# Patient Record
Sex: FEMALE | Race: WHITE | NOT HISPANIC OR LATINO
[De-identification: names, ages, dates, MRNs, and addresses within clinical notes are randomized per-mention and may not be internally consistent; named-entity substitution may affect disease eponyms.]

---

## 2022-08-02 ENCOUNTER — NON-APPOINTMENT (OUTPATIENT)
Age: 82
End: 2022-08-02

## 2022-08-02 ENCOUNTER — APPOINTMENT (OUTPATIENT)
Dept: OPHTHALMOLOGY | Facility: CLINIC | Age: 82
End: 2022-08-02

## 2022-08-02 PROBLEM — Z00.00 ENCOUNTER FOR PREVENTIVE HEALTH EXAMINATION: Status: ACTIVE | Noted: 2022-08-02

## 2022-08-02 PROCEDURE — 92004 COMPRE OPH EXAM NEW PT 1/>: CPT

## 2022-08-02 PROCEDURE — 92136 OPHTHALMIC BIOMETRY: CPT

## 2023-04-05 ENCOUNTER — APPOINTMENT (OUTPATIENT)
Dept: SPINE | Facility: CLINIC | Age: 83
End: 2023-04-05
Payer: MEDICARE

## 2023-04-05 ENCOUNTER — RESULT REVIEW (OUTPATIENT)
Age: 83
End: 2023-04-05

## 2023-04-05 ENCOUNTER — NON-APPOINTMENT (OUTPATIENT)
Age: 83
End: 2023-04-05

## 2023-04-05 ENCOUNTER — OUTPATIENT (OUTPATIENT)
Dept: OUTPATIENT SERVICES | Facility: HOSPITAL | Age: 83
LOS: 1 days | End: 2023-04-05
Payer: MEDICARE

## 2023-04-05 DIAGNOSIS — Z87.19 PERSONAL HISTORY OF OTHER DISEASES OF THE DIGESTIVE SYSTEM: ICD-10-CM

## 2023-04-05 DIAGNOSIS — Z87.891 PERSONAL HISTORY OF NICOTINE DEPENDENCE: ICD-10-CM

## 2023-04-05 DIAGNOSIS — Z78.9 OTHER SPECIFIED HEALTH STATUS: ICD-10-CM

## 2023-04-05 DIAGNOSIS — E78.01 FAMILIAL HYPERCHOLESTEROLEMIA: ICD-10-CM

## 2023-04-05 PROCEDURE — 72052 X-RAY EXAM NECK SPINE 6/>VWS: CPT | Mod: 26,59

## 2023-04-05 PROCEDURE — 72084 X-RAY EXAM ENTIRE SPI 6/> VW: CPT | Mod: 26

## 2023-04-05 PROCEDURE — 72052 X-RAY EXAM NECK SPINE 6/>VWS: CPT

## 2023-04-05 PROCEDURE — 99204 OFFICE O/P NEW MOD 45 MIN: CPT

## 2023-04-05 PROCEDURE — 72114 X-RAY EXAM L-S SPINE BENDING: CPT

## 2023-04-05 PROCEDURE — 72082 X-RAY EXAM ENTIRE SPI 2/3 VW: CPT

## 2023-04-05 RX ORDER — METHYLPREDNISOLONE 4 MG/1
4 TABLET ORAL
Qty: 1 | Refills: 0 | Status: ACTIVE | COMMUNITY
Start: 2023-04-05 | End: 1900-01-01

## 2023-04-05 NOTE — HISTORY OF PRESENT ILLNESS
[FreeTextEntry1] : acute neck pain with R hand weakness [de-identified] : 83 yo RH F with PMH of cervical and lumbar stenosis who underwent cervical posterior decompression/fusion and lumbar laminectomy with Dr. Glynn @ [facility] in 5/2022 who reports sudden onset of moderate to severe neck to RUE pain with weakness for 2 weeks without injury.\par Prior to the surgery in 2022, she was having back pain and some balance problem and images showed severe stenosis with cord compression in cervical spine. AS per patient her pain has been controlled well until recently. \par Currently pain is described as severe intermittent on her R sided neck radiating to R shoulder/shoulder blade/arm with weakness on R hand causing difficulty grabbing objects. Additionally she reports moderate R thigh tightness for one week. She denies balance problem, BB dysfunction, LE weakness. \par She ambulates without assistive device (occasionally use cane).\par She is unable to tolerate NSAIDs (gastric issue), taking gabapentin 100mg TID/Tylenol PRN occasionally without improvement.

## 2023-04-05 NOTE — PHYSICAL EXAM
[General Appearance - Alert] : alert [General Appearance - In No Acute Distress] : in no acute distress [General Appearance - Well Nourished] : well nourished [General Appearance - Well-Appearing] : healthy appearing [Oriented To Time, Place, And Person] : oriented to person, place, and time [Impaired Insight] : insight and judgment were intact [Affect] : the affect was normal [Memory Recent] : recent memory was not impaired [4] : T1 abductor digiti minimi 4/5 [5] : S1 flexor hallucis longus 5/5 [Abnormal Walk] : normal gait [Balance] : balance was intact [1+] : Ankle jerk left 1+ [Owusu] : Owusu's sign was not demonstrated

## 2023-04-05 NOTE — ASSESSMENT
[FreeTextEntry1] : PLAN\par - medrol pack\par - continue gabapentin 100mg QHS and increase to 300mg if she can tolerates\par - Xray C/L spine 6 views, scoliosis today\par - bring MRI cervical spine on next visit (reports she had this done today @ [facility])\par - RTC next week to review images

## 2023-04-13 ENCOUNTER — APPOINTMENT (OUTPATIENT)
Dept: SPINE | Facility: CLINIC | Age: 83
End: 2023-04-13
Payer: MEDICARE

## 2023-04-13 ENCOUNTER — NON-APPOINTMENT (OUTPATIENT)
Age: 83
End: 2023-04-13

## 2023-04-13 VITALS
TEMPERATURE: 97.3 F | BODY MASS INDEX: 22.47 KG/M2 | WEIGHT: 119 LBS | RESPIRATION RATE: 17 BRPM | SYSTOLIC BLOOD PRESSURE: 160 MMHG | HEART RATE: 75 BPM | OXYGEN SATURATION: 96 % | HEIGHT: 61 IN | DIASTOLIC BLOOD PRESSURE: 75 MMHG

## 2023-04-13 PROCEDURE — 99215 OFFICE O/P EST HI 40 MIN: CPT

## 2023-05-10 NOTE — HISTORY OF PRESENT ILLNESS
[de-identified] : Patient is an 82-year-old woman who is the sister of one of my former patients she had a spine surgery at [facility] and has had some issues and is here for second opinion

## 2023-05-10 NOTE — ASSESSMENT
[FreeTextEntry1] : We do long discussion about her recovery and I would like to take some time to see how she improves we discussed what it would take to repair this and answered all her questions and her family's questions to the best my ability.\par \par Art Colvin M.D., M.Sc.\par \par Department of Neurosurgery\par [facility]\par [facility]\par [facility]\par [place], [place]\par [email or web address]\par [phone]

## 2023-06-27 ENCOUNTER — APPOINTMENT (OUTPATIENT)
Dept: PAIN MANAGEMENT | Facility: CLINIC | Age: 83
End: 2023-06-27
Payer: MEDICARE

## 2023-06-27 VITALS
BODY MASS INDEX: 22.08 KG/M2 | SYSTOLIC BLOOD PRESSURE: 148 MMHG | WEIGHT: 120 LBS | HEART RATE: 73 BPM | HEIGHT: 62 IN | DIASTOLIC BLOOD PRESSURE: 81 MMHG | OXYGEN SATURATION: 98 % | RESPIRATION RATE: 18 BRPM

## 2023-06-27 DIAGNOSIS — M54.12 RADICULOPATHY, CERVICAL REGION: ICD-10-CM

## 2023-06-27 DIAGNOSIS — M50.20 OTHER CERVICAL DISC DISPLACEMENT, UNSPECIFIED CERVICAL REGION: ICD-10-CM

## 2023-06-27 PROCEDURE — 99203 OFFICE O/P NEW LOW 30 MIN: CPT

## 2023-06-27 NOTE — REVIEW OF SYSTEMS
[Neck Pain] : neck pain [Radiating Pain] : radiating pain [Decreased ROM] : decreased range of motion [Negative] : Heme/Lymph

## 2023-07-07 ENCOUNTER — APPOINTMENT (OUTPATIENT)
Dept: PAIN MANAGEMENT | Facility: CLINIC | Age: 83
End: 2023-07-07
Payer: MEDICARE

## 2023-07-07 VITALS
HEIGHT: 63 IN | RESPIRATION RATE: 18 BRPM | BODY MASS INDEX: 21.09 KG/M2 | WEIGHT: 119 LBS | SYSTOLIC BLOOD PRESSURE: 129 MMHG | DIASTOLIC BLOOD PRESSURE: 76 MMHG | HEART RATE: 77 BPM | OXYGEN SATURATION: 96 %

## 2023-07-07 DIAGNOSIS — M96.1 POSTLAMINECTOMY SYNDROME, NOT ELSEWHERE CLASSIFIED: ICD-10-CM

## 2023-07-07 PROCEDURE — 62324 NJX INTERLAMINAR CRV/THRC: CPT

## 2023-07-19 ENCOUNTER — APPOINTMENT (OUTPATIENT)
Dept: PAIN MANAGEMENT | Facility: CLINIC | Age: 83
End: 2023-07-19
Payer: MEDICARE

## 2023-07-19 PROCEDURE — 99204 OFFICE O/P NEW MOD 45 MIN: CPT

## 2023-07-20 NOTE — REVIEW OF SYSTEMS
[Neck Pain] : neck pain [Weakness] : weakness [Numbness] : numbness [Chills] : no chills [Fever] : no fever [SOB at rest] : no shortness of breath at rest [Lower Ext Edema] : no lower extremity edema [FreeTextEntry9] : right  hand weakness

## 2023-07-20 NOTE — PHYSICAL EXAM
[Owusu's Sign] : positive Owusu's Sign [] : Motor: [___/5] : right ([unfilled]/5) [Motor Strength Upper Extremities] : left (5/5) [Cranial Nerves Oculomotor (III)] : extraocular motion intact [Cranial Nerves Facial (VII)] : face symmetrical [Normal] : Normal affect [de-identified] : no abdominal distension  [de-identified] : Patient has 4/5 strength for FPL on the right compared to the left and also D2 FDP on the right compared to D2 on the left.

## 2023-07-20 NOTE — HISTORY OF PRESENT ILLNESS
[Hand Numbness] : hand numbness [FreeTextEntry1] : 84yo F with history of cervical posterior decompression/fusion and lumbar [facility]22 presents with persistent neck and RUE pain. She reports pain began a few months ago. Was unable to schedule appt with Dr Obrien so saw Dr Colvin in April who advised against revision at this time and referred patient to Pain Management. Patient states pain radiates into right shoulder and goes down RUE to her hand. She report difficulty with right hand dexterity. Has been taking Meloxicam which provides minimal, temporary relief. Pain limits ability to performs ADLs and affects RUE and cervical ROM. No recent injections. Her last MRI C spine is from April 2023 which demonstrated resolution of spinal canal stenosis from C4-C7. MRI also showed mod central canal stenosis as well as mod-marked b/l foraminal stenosis at T1-T2 and multilevel degenerative changes \par \par JENNIFER CLARKE presents today with complaints of right shoulder pain and right hand pain, but overall she feels that her right arm pain has improved following last cervical epidural steroid injection. She notes the hand symptoms primarily when attempting to draw using her paint brush. She also notes her shoulder pain is chronic and considered having shoulder surgery in the past. [FreeTextEntry7] : right side

## 2023-07-20 NOTE — ASSESSMENT
[FreeTextEntry1] : Patient with history of cervical decompression and fusion in 5/2022 presents with 3 month history of right sided radicular neck pain and right arm/shoulder pain. She recently saw Dr Colvin for second opinion who did not recommend revision at this time and referred patient for Pain Management. We initially performed a cervical epidural steroid injection on 6/27/2023 and since then her RUE paresthesias have improved. She still has some right hand weakness and some residual pain, but improved from before. She is in agreement with repeating the injection and will have patient follow up in one week for cervical YAKELIN. We will also write a script for occupational therapy (focused on hand strength). \par \par She also has continued right shoulder pain secondary to chronic shoulder pathology and can consider intra-articular steroid injection in the future vs PNS device. \par \par

## 2023-07-27 ENCOUNTER — APPOINTMENT (OUTPATIENT)
Dept: PAIN MANAGEMENT | Facility: CLINIC | Age: 83
End: 2023-07-27
Payer: MEDICARE

## 2023-07-27 VITALS
SYSTOLIC BLOOD PRESSURE: 151 MMHG | HEART RATE: 88 BPM | WEIGHT: 120 LBS | BODY MASS INDEX: 21.26 KG/M2 | DIASTOLIC BLOOD PRESSURE: 80 MMHG | HEIGHT: 63 IN | OXYGEN SATURATION: 97 %

## 2023-07-27 DIAGNOSIS — R29.898 OTHER SYMPTOMS AND SIGNS INVOLVING THE MUSCULOSKELETAL SYSTEM: ICD-10-CM

## 2023-07-27 PROCEDURE — 62321 NJX INTERLAMINAR CRV/THRC: CPT

## 2023-07-27 NOTE — HISTORY OF PRESENT ILLNESS
[FreeTextEntry1] : Patient is a 83 year old Female with history of cervical posterior decompression/fusion and lumbar laminectomy with Dr. Obrien @ [facility] in 5/2022. Patient had a Cervical Interlaminar epidural steroid injection on 07/07/23 and is feeling significant improvement in pain. She rates the pain a 4/10 today. Pain is in right shoulder and radiates down right upper extremity. She also reports continued weakness in right hand. She feels she has been compensating by using her left arm, and has now developed left shoulder pain. She has not been as active with her right arm and hand and mainly keeps it at rest. \par  [Joint Pain] : joint  [_______] : [unfilled] [4] : a current pain level of 4/10

## 2023-07-27 NOTE — HISTORY OF PRESENT ILLNESS
[FreeTextEntry1] : Patient is a 83 year old Female with history of cervical posterior decompression/fusion and lumbar laminectomy with Dr. Obrien @ [facility] in 5/2022. Patient had a Cervical Interlaminar epidural steroid injection on 07/07/23 and is feeling significant improvement in pain. She rates the pain a 4/10 today. Pain is in right shoulder and radiates down right upper extremity. She also reports continued weakness in right hand. She feels she has been compensating by using her left arm, and has now developed left shoulder pain. She has not been as active with her right arm and hand and mainly keeps it at rest. \par  [Joint Pain] : joint  [_______] : [unfilled] [4] : a current pain level of 4/10

## 2023-07-27 NOTE — PHYSICAL EXAM
[Normal] : Non-labored breathing, no audible wheezes [Neer's] : positive Neer's Test [Owusu's Sign] : positive Owusu's Sign [] : Motor: [NL] : Motor strength of the left upper extremities is normal [Motor Strength Upper Extremities] : right (5/5) [___/5] : right ([unfilled]/5) [UE] : Sensory: Intact in bilateral upper extremities [de-identified] : Pain to palpation of bilateral scapulae

## 2023-07-27 NOTE — PHYSICAL EXAM
[Normal] : Non-labored breathing, no audible wheezes [Neer's] : positive Neer's Test [Owusu's Sign] : positive Owusu's Sign [] : Motor: [NL] : Motor strength of the left upper extremities is normal [Motor Strength Upper Extremities] : right (5/5) [___/5] : right ([unfilled]/5) [UE] : Sensory: Intact in bilateral upper extremities [de-identified] : Pain to palpation of bilateral scapulae

## 2023-07-27 NOTE — REVIEW OF SYSTEMS
[Neck Pain] : no neck pain [Radiating Pain] : radiating pain [Weakness] : weakness [Numbness] : numbness [Negative] : Cardiovascular

## 2023-08-23 ENCOUNTER — APPOINTMENT (OUTPATIENT)
Dept: PAIN MANAGEMENT | Facility: CLINIC | Age: 83
End: 2023-08-23
Payer: MEDICARE

## 2023-08-23 ENCOUNTER — TRANSCRIPTION ENCOUNTER (OUTPATIENT)
Age: 83
End: 2023-08-23

## 2023-08-23 VITALS
HEIGHT: 63 IN | BODY MASS INDEX: 20.91 KG/M2 | WEIGHT: 118 LBS | SYSTOLIC BLOOD PRESSURE: 135 MMHG | HEART RATE: 99 BPM | DIASTOLIC BLOOD PRESSURE: 71 MMHG | OXYGEN SATURATION: 98 %

## 2023-08-23 PROCEDURE — 64490 INJ PARAVERT F JNT C/T 1 LEV: CPT | Mod: 50

## 2023-08-23 PROCEDURE — 64491 INJ PARAVERT F JNT C/T 2 LEV: CPT | Mod: 50

## 2023-08-23 NOTE — ASSESSMENT
[FreeTextEntry1] : Patient is a 83 year old Female with history of cervical posterior decompression/fusion and lumbar laminectomy with Dr. Obrien @ [facility] in 5/2022. Patient had a Cervical Interlaminar epidural steroid injection x2 on 07/07/23 and 07/27/23 reports improvement in symptoms. Plan to have a second cervical epidural steroid injection today and continue with physical therapy for cervical spine and right hand weakness.   Plan: - right C3-5 medial branch block - follow up in three weeks    The potential benefits as well as rare but possible risks were reviewed with the patient.  These risks including infection including epidural abscess, meningitis, osteomyelitis, and discitis, bleeding including epidural hematoma, nerve injury, paralysis, failure to relieve pain or worse pain, headache, pneumothorax, elevated blood sugars, allergic reactions, adverse reactions to medications, vasovagal reactions, falls, etc. Following that discussion, we decided together that the potential benefits outweigh the potential risks and we decided to proceed with scheduling the procedure.  I answered all the patient's questions about the procedure including the technical details of the procedure and also offered that if any other questions arise we will also be happy to answer those on the day of the procedure. All questions today were answered to the patients satisfaction, and the patient stated their verbal understanding.

## 2023-08-23 NOTE — PROCEDURE
[FreeTextEntry1] : Cervical Medial Branch Block (C3-5) right  The potential benefits as well as rare but possible risks were reviewed with the patient.  These risks including infection including epidural abscess, meningitis, osteomyelitis, and discitis, bleeding including epidural hematoma, nerve injury, paralysis, failure to relieve pain or worse pain, headache, pneumothorax, elevated blood sugars, allergic reactions, adverse reactions to medications, vasovagal reactions, falls, etc. Following that discussion, all questions were again answered to the patients satisfaction, the patient stated their verbal understanding and written consent was obtained.    After obtaining consent, pre-procedure blood pressure and pulse were recorded and are in the nursing record for review. The patient was placed in the supine position. The respective cervical area was prepped with chloroprep and draped in sterile fashion. Lateral fluoroscopy was used during the procedure  A 25 gauge 1.5 inch needle was inserted into the target medial branch nerve under fluoroscopic guidance. No paresthesias were elicited with needle placement and aspiration was negative for blood and CSF. Next 1 ml of a Solution of 2 ml Bupivacaine 0.25% and 10 mg Dexamethasone was injected.   The identical procedure was performed at the remaining levels. The skin was cleansed, and a sterile bandage was applied. Following the procedure, the patient's vital signs were stable. The patient tolerated the procedure well and no complications were encountered. The patient was discharged home in good condition with post-procedural instructions.  Time Out: Immediately prior to the procedure, the following was verbally confirmed that there is a consent form and that the correct patient, planned procedure, site and side are consistent with documentation and that necessary equipment and/or blood products are available prior to the start of the case.  Complications: none EBL: <5 cc

## 2023-08-23 NOTE — ASSESSMENT
[FreeTextEntry1] : Patient is a 83 year old Female with history of cervical posterior decompression/fusion and lumbar laminectomy with Dr. Obrien @ [facility] in 5/2022. Patient had a Cervical Interlaminar epidural steroid injection x2 on 07/07/23 and 07/27/23 reports improvement in symptoms. Plan to have a second cervical epidural steroid injection today and continue with physical therapy for cervical spine and right hand weakness.   Plan: - right C3-5 medial branch block - follow up in three weeks    The potential benefits as well as rare but possible risks were reviewed with the patient.  These risks including infection including epidural abscess, meningitis, osteomyelitis, and discitis, bleeding including epidural hematoma, nerve injury, paralysis, failure to relieve pain or worse pain, headache, pneumothorax, elevated blood sugars, allergic reactions, adverse reactions to medications, vasovagal reactions, falls, etc. Following that discussion, we decided together that the potential benefits outweigh the potential risks and we decided to proceed with scheduling the procedure.  I answered all the patient's questions about the procedure including the technical details of the procedure and also offered that if any other questions arise we will also be happy to answer those on the day of the procedure. All questions today were answered to the patients satisfaction, and the patient stated their verbal understanding.

## 2023-09-13 ENCOUNTER — APPOINTMENT (OUTPATIENT)
Dept: PAIN MANAGEMENT | Facility: CLINIC | Age: 83
End: 2023-09-13
Payer: MEDICARE

## 2023-09-13 VITALS — HEIGHT: 63 IN | BODY MASS INDEX: 21.09 KG/M2 | WEIGHT: 119 LBS

## 2023-09-13 VITALS — HEART RATE: 70 BPM | SYSTOLIC BLOOD PRESSURE: 140 MMHG | DIASTOLIC BLOOD PRESSURE: 75 MMHG

## 2023-09-13 DIAGNOSIS — M25.811 OTHER SPECIFIED JOINT DISORDERS, RIGHT SHOULDER: ICD-10-CM

## 2023-09-13 PROCEDURE — 77002 NEEDLE LOCALIZATION BY XRAY: CPT

## 2023-09-13 PROCEDURE — 99214 OFFICE O/P EST MOD 30 MIN: CPT | Mod: 25

## 2023-09-13 PROCEDURE — 20610 DRAIN/INJ JOINT/BURSA W/O US: CPT | Mod: RT

## 2023-10-09 PROBLEM — M96.1 FAILED NECK SYNDROME: Status: ACTIVE | Noted: 2023-05-01

## 2023-10-11 ENCOUNTER — APPOINTMENT (OUTPATIENT)
Dept: PAIN MANAGEMENT | Facility: CLINIC | Age: 83
End: 2023-10-11

## 2023-10-13 ENCOUNTER — APPOINTMENT (OUTPATIENT)
Dept: DERMATOLOGY | Facility: CLINIC | Age: 83
End: 2023-10-13

## 2023-10-18 ENCOUNTER — APPOINTMENT (OUTPATIENT)
Dept: PAIN MANAGEMENT | Facility: CLINIC | Age: 83
End: 2023-10-18
Payer: MEDICARE

## 2023-10-18 VITALS
SYSTOLIC BLOOD PRESSURE: 143 MMHG | HEIGHT: 63 IN | WEIGHT: 120 LBS | HEART RATE: 83 BPM | OXYGEN SATURATION: 97 % | DIASTOLIC BLOOD PRESSURE: 66 MMHG | BODY MASS INDEX: 21.26 KG/M2

## 2023-10-18 DIAGNOSIS — M54.12 RADICULOPATHY, CERVICAL REGION: ICD-10-CM

## 2023-10-18 DIAGNOSIS — Z98.1 ARTHRODESIS STATUS: ICD-10-CM

## 2023-10-18 DIAGNOSIS — M48.02 SPINAL STENOSIS, CERVICAL REGION: ICD-10-CM

## 2023-10-18 DIAGNOSIS — M48.062 SPINAL STENOSIS, LUMBAR REGION WITH NEUROGENIC CLAUDICATION: ICD-10-CM

## 2023-10-18 DIAGNOSIS — M25.511 PAIN IN RIGHT SHOULDER: ICD-10-CM

## 2023-10-18 DIAGNOSIS — M47.812 SPONDYLOSIS W/OUT MYELOPATHY OR RADICULOPATHY, CERVICAL REGION: ICD-10-CM

## 2023-10-18 DIAGNOSIS — M75.01 ADHESIVE CAPSULITIS OF RIGHT SHOULDER: ICD-10-CM

## 2023-10-18 DIAGNOSIS — Z98.890 OTHER SPECIFIED POSTPROCEDURAL STATES: ICD-10-CM

## 2023-10-18 PROCEDURE — 99214 OFFICE O/P EST MOD 30 MIN: CPT

## 2023-12-27 PROBLEM — M50.20 DISPLACEMENT OF CERVICAL INTERVERTEBRAL DISC WITHOUT MYELOPATHY: Status: ACTIVE | Noted: 2023-12-27

## 2023-12-27 PROBLEM — M54.12 CERVICAL RADICULOPATHY, CHRONIC: Status: ACTIVE | Noted: 2023-12-27

## 2023-12-27 NOTE — HISTORY OF PRESENT ILLNESS
[Neck Pain] : neck pain [___ mths] : [unfilled] month(s) ago [Constant] : constant [6] : an average pain level of 6/10 [Sharp] : sharp [Shooting] : shooting [Looking Up] : looking up [Looking Down] : looking down [Medications] : medications [FreeTextEntry1] : 84yo F with history of ervical posterior decompression/fusion and lumbar laminectomy with Dr[facility] presents with persistent neck and RUE pain. She reports pain began a few months ago. Was unable to schedule appt with Dr Glynn so saw Dr Colvin in April who advised against revision at this time and referred patient to Pain Management. Patient states pain radiates into right shoulder and goes down RUE to her hand. She report difficulty with right hand dexterity. Has been taking Meloxicam which provides minimal, temporary relief. Pain limits ability to performs ADLs and affects RUE and cervical ROM. No recent injections. Her last MRI C spine is from April 2023 which demonstrated resolution of spinal canal stenosis from C4-C7. MRI also showed mod central canal stenosis as well as mod-marked b/l foraminal stenosis at T1-T2 and multilevel degenerative changes

## 2023-12-27 NOTE — ASSESSMENT
[FreeTextEntry1] : Patient with history of cervical decompression and fusion in 5/2022 presents with 3 month history of right sided radicular neck pain and right arm/shoulder pain. She recently saw Dr Colvin for second opinion who did not recommend revision at this time and referred patient for Pain Management. We discussed trial DANE to see if that provides some relief of neck and arm pain. Suspect she may also have shoulder pathology for which we may consider diagnostic right shoulder CSI as well

## 2023-12-27 NOTE — PHYSICAL EXAM
[Normal] : Regular, no tachycardia [Paraspinal Tenderness] : paraspinal tenderness [UE] : Sensory: Intact in bilateral upper extremities [Owusu's Sign] : negative Owusu's Sign [de-identified] : limited AROM C spine in all planes, greatest limitation with neck extension and b/l rotation  [de-identified] : slow, cautious gait

## 2023-12-27 NOTE — REASON FOR VISIT
[Initial Visit] : an initial pain management visit [Family Member] : family member [FreeTextEntry2] : neck and arm pain

## 2024-01-16 ENCOUNTER — APPOINTMENT (OUTPATIENT)
Dept: NEUROSURGERY | Facility: CLINIC | Age: 84
End: 2024-01-16

## 2024-01-23 ENCOUNTER — APPOINTMENT (OUTPATIENT)
Dept: NEUROSURGERY | Facility: CLINIC | Age: 84
End: 2024-01-23

## 2024-01-23 ENCOUNTER — OUTPATIENT (OUTPATIENT)
Dept: OUTPATIENT SERVICES | Facility: HOSPITAL | Age: 84
LOS: 1 days | End: 2024-01-23
Payer: MEDICARE

## 2024-01-23 ENCOUNTER — APPOINTMENT (OUTPATIENT)
Dept: SPINE | Facility: CLINIC | Age: 84
End: 2024-01-23
Payer: MEDICARE

## 2024-01-23 VITALS
BODY MASS INDEX: 20.63 KG/M2 | RESPIRATION RATE: 16 BRPM | HEART RATE: 75 BPM | HEIGHT: 62.5 IN | TEMPERATURE: 98.1 F | SYSTOLIC BLOOD PRESSURE: 146 MMHG | OXYGEN SATURATION: 98 % | WEIGHT: 115 LBS | DIASTOLIC BLOOD PRESSURE: 70 MMHG

## 2024-01-23 DIAGNOSIS — M54.30 SCIATICA, UNSPECIFIED SIDE: ICD-10-CM

## 2024-01-23 PROCEDURE — 72114 X-RAY EXAM L-S SPINE BENDING: CPT | Mod: 26

## 2024-01-23 PROCEDURE — 99215 OFFICE O/P EST HI 40 MIN: CPT

## 2024-01-23 PROCEDURE — 72114 X-RAY EXAM L-S SPINE BENDING: CPT

## 2024-01-23 PROCEDURE — 72052 X-RAY EXAM NECK SPINE 6/>VWS: CPT

## 2024-01-23 PROCEDURE — 72052 X-RAY EXAM NECK SPINE 6/>VWS: CPT | Mod: 26

## 2024-01-23 NOTE — REVIEW OF SYSTEMS
[Numbness] : numbness [Tingling] : tingling [Abnormal Sensation] : an abnormal sensation [As Noted in HPI] : as noted in HPI [Negative] : Respiratory [Frequent Falls] : not falling

## 2024-01-23 NOTE — HISTORY OF PRESENT ILLNESS
[FreeTextEntry1] : 83yRHF with Hx of cervical radiculopathy, cervical posterior decompression/fusion and lumbar laminectomy (05/2022, Dr. Obrien at [place]), ileostomy. Pt has had steroid injections in BL shoulders for shoulder pain.  1/23/24 Today pt complains of left leg pain, pain starts in left buttocks and radiates down to the left calf and foot. Pt complains of left foot numbness/tingling and is unable to raise left foot unassisted. Today pt is ambulating with a cane. Pt denies any recent falls. Pt is taking tylenol and meloxicam daily PRN. Pt denies any bowel incontinence.  XRays reviewed.

## 2024-02-22 ENCOUNTER — APPOINTMENT (OUTPATIENT)
Dept: SPINE | Facility: CLINIC | Age: 84
End: 2024-02-22
Payer: MEDICARE

## 2024-02-22 PROCEDURE — 99214 OFFICE O/P EST MOD 30 MIN: CPT

## 2024-02-22 NOTE — RESULTS/DATA
[FreeTextEntry1] : EXAM:  MRI LUMBAR SPINE WITHOUT CONTRAST 2/2/24 HISTORY: Lower back pain.  Left-sided pain. TECHNIQUE: Multiplanar, multi-sequential MRI of the lumbar spine was obtained on a 3T scanner using a standard protocol. COMPARISON:  12/07/2012 MRI lumbar spine. FINDINGS: Shallow S-shaped scoliosis. Stable grade 1 anterolisthesis at L4-L5. There is desiccation of all lumbar discs, reduced height of all lumbar discs, most significant at L5-S1, L2-L3 and T12-L1. There are chronic reactive marrow changes across BOTH sides of the L5-S1 endplates. All aforementioned findings represent progression. No acute compression fracture. L1-L2, moderate central canal stenosis, severe LEFT foraminal stenosis from leftward eccentric annular extrusion which flattens the ventral thecal sac asymmetrically greater on the LEFT and enters the LEFT foramen and compresses the exiting nerve root. Facet arthropathy is severe BILATERALLY, worse on RIGHT. A previous LEFT laminotomy with partial LEFT facetectomy appears to have taken place in the interim, although patient denies any previous history. Please correlate clinically. Moderate RIGHT foraminal stenosis with contact of the surfaces of the exiting nerve root by bulge and severe facet hypertrophy. L2-L3, severe BILATERAL foraminal stenosis, worse on RIGHT where there is partial compression of the exiting nerve root from bulge and facet hypertrophy. Again, previous surgery with LEFT laminectomy and partial medial facetectomies appear to have taken place in the interim. Thecal sac patent. L3-L4, severe central canal stenosis with severe BILATERAL foraminal stenosis from bulge osteophyte complex and severe hypertrophic facet arthropathy which contact and partially flatten the surfaces of the exiting RIGHT L3 nerve root and contact the surfaces of the exiting LEFT L3 nerve root without significant nerve root compression. Facet arthropathy and ligamentum flavum thickening are severe. L4-L5, severe hypertrophic facet arthropathy with ligamentum flavum thickening produce mild grade 1 anterolisthesis but without canal or foraminal stenosis or compression of the exiting nerve roots. L5-S1, severe BILATERAL foraminal stenosis with compression of BOTH exiting nerve roots from thin bulge osteophyte complex and moderate to severe RIGHT and moderate LEFT hypertrophic facet arthropathy. Concentric bulge is fully contained in the epidural fat but does contact BOTH descending S1 nerve roots in the lateral recesses. There is no thecal sac compression or canal stenosis. Conus terminates at mid L1 and shows no lesion. Visualized intrathecal nerve roots are well dispersed. Visualized paravertebral soft tissues demonstrate no significant abnormality. IMPRESSION:  1.  Moderate to severe multilevel spondylosis demonstrating varying degrees of multilevel foraminal stenoses, severe at multiple levels as detailed. 2.  L3-L4 severe central canal stenosis with severe BILATERAL foraminal stenosis

## 2024-02-22 NOTE — HISTORY OF PRESENT ILLNESS
[FreeTextEntry1] : 83yRHF with Hx of cervical radiculopathy, cervical posterior decompression/fusion and lumbar laminectomy (05/2022, Dr. Obrien at [place]), ileostomy. Pt has had steroid injections in BL shoulders for shoulder pain.  1/23/24 Today pt complains of left leg pain, pain starts in left buttocks and radiates down to the left calf and foot. Pt complains of left foot numbness/tingling and is unable to raise left foot unassisted. Today pt is ambulating with a cane. Pt denies any recent falls. Pt is taking tylenol and meloxicam daily PRN. Pt denies any bowel incontinence. XRays reviewed.  Today 2/13/24  MRI Reviewed